# Patient Record
Sex: FEMALE
[De-identification: names, ages, dates, MRNs, and addresses within clinical notes are randomized per-mention and may not be internally consistent; named-entity substitution may affect disease eponyms.]

---

## 2020-06-18 ENCOUNTER — HOSPITAL ENCOUNTER (INPATIENT)
Dept: HOSPITAL 92 - L&D/OP | Age: 25
LOS: 1 days | Discharge: HOME | End: 2020-06-19
Attending: FAMILY MEDICINE | Admitting: FAMILY MEDICINE
Payer: SELF-PAY

## 2020-06-18 VITALS — BODY MASS INDEX: 33.5 KG/M2

## 2020-06-18 DIAGNOSIS — O36.63X0: Primary | ICD-10-CM

## 2020-06-18 DIAGNOSIS — Z3A.39: ICD-10-CM

## 2020-06-18 LAB
HBSAG INDEX: 0.17 S/CO (ref 0–0.99)
HGB BLD-MCNC: 12.1 G/DL (ref 12–16)
MCH RBC QN AUTO: 27 PG (ref 27–31)
MCV RBC AUTO: 80 FL (ref 78–98)
PLATELET # BLD AUTO: 287 THOU/UL (ref 130–400)
RBC # BLD AUTO: 4.49 MILL/UL (ref 4.2–5.4)
SYPHILIS ANTIBODY INDEX: 0.05 S/CO
WBC # BLD AUTO: 10.1 THOU/UL (ref 4.8–10.8)

## 2020-06-18 PROCEDURE — 85018 HEMOGLOBIN: CPT

## 2020-06-18 PROCEDURE — 36415 COLL VENOUS BLD VENIPUNCTURE: CPT

## 2020-06-18 PROCEDURE — 85014 HEMATOCRIT: CPT

## 2020-06-18 PROCEDURE — 10907ZC DRAINAGE OF AMNIOTIC FLUID, THERAPEUTIC FROM PRODUCTS OF CONCEPTION, VIA NATURAL OR ARTIFICIAL OPENING: ICD-10-PCS | Performed by: FAMILY MEDICINE

## 2020-06-18 PROCEDURE — 0HQ9XZZ REPAIR PERINEUM SKIN, EXTERNAL APPROACH: ICD-10-PCS | Performed by: FAMILY MEDICINE

## 2020-06-18 PROCEDURE — 99285 EMERGENCY DEPT VISIT HI MDM: CPT

## 2020-06-18 PROCEDURE — 86901 BLOOD TYPING SEROLOGIC RH(D): CPT

## 2020-06-18 PROCEDURE — 86850 RBC ANTIBODY SCREEN: CPT

## 2020-06-18 PROCEDURE — 86780 TREPONEMA PALLIDUM: CPT

## 2020-06-18 PROCEDURE — 10H073Z INSERTION OF MONITORING ELECTRODE INTO PRODUCTS OF CONCEPTION, VIA NATURAL OR ARTIFICIAL OPENING: ICD-10-PCS | Performed by: FAMILY MEDICINE

## 2020-06-18 PROCEDURE — 86900 BLOOD TYPING SEROLOGIC ABO: CPT

## 2020-06-18 PROCEDURE — 10H07YZ INSERTION OF OTHER DEVICE INTO PRODUCTS OF CONCEPTION, VIA NATURAL OR ARTIFICIAL OPENING: ICD-10-PCS | Performed by: FAMILY MEDICINE

## 2020-06-18 PROCEDURE — 85027 COMPLETE CBC AUTOMATED: CPT

## 2020-06-18 PROCEDURE — 87340 HEPATITIS B SURFACE AG IA: CPT

## 2020-06-18 RX ADMIN — DOCUSATE CALCIUM SCH MG: 240 CAPSULE, LIQUID FILLED ORAL at 21:47

## 2020-06-18 NOTE — PDOC.LDPN
Labor & Delivery Progress Note





- Subjective


Subjective: painful contractions





- Objective


Vital signs reviewed and normal: yes


General: breathing through contractions


SVE: 7100/-1


FHT: category 1


[place] contractions every: 3-5 min


AROM: clear fluid


IUPC placed: yes


FSE placed: yes





- Assessment


(1) Pregnancy


Current Visit: Yes   Status: Acute   


Plan: continue plan of care


-: 











This is a 25 yo  at 39.3 wks by 27 wk sono here for labor


#sIUP


-SVE @ 1500: /-1


- SVE unchanged


- AROM, clear fluid


- IUPC FSE placed


- Cat 1 FHTs after placement of IUPC and FSE, cxn q2-5 min


- does not desire epidural. 


- Continue expectant management, recheck in 2 hours





EMIR Melara MD PGY2

## 2020-06-18 NOTE — PDOC.LDPN
Labor & Delivery Progress Note





- Subjective


Subjective: painful contractions, no concerns





- Objective


Vital signs reviewed and normal: yes


General: breathing through contractions


SVE: soft, anterior


Dilation: 7


Effacement: 100%


Station: -1


FHT: category 1, variability present


[place] contractions every: 3 min


Plan: continue plan of care


-: 





- continue expectant mgmt


- membranes intact


- making change on her own


- does not desire epidural.

## 2020-06-18 NOTE — PDOC.OPDEL
OB Operative/Delivery Note


Delivery Dr/Surgeon: Andrade Srinivasan (continuity), Russ (attending)


Pre-Delivery Diagnosis: active labor


Procedure/Post Delivery Dx: spontaneous vaginal delivery


Weeks gestation: 39 (39.3)


Anesthesia: local (10 mL lidocaine 1%)





- Additional Findings/Plan


Placenta delivered: spontaneous


Repaired Obstetrical Laceration: 1st degree (perineal)


Estimated blood loss: QBL 100mL


Compilations/Other Findings: 





Procedure: Spontaneous Vaginal Delivery


Anesthesia: Local for Repair


QBL: 100 ml





Pre-op Diagnosis:


1. Term intrauterine pregnancy in labor


2. Fetal ultrasound showing enlarged cisterna magnum





Post-op Diagnosis:


1. Term intrauterine pregnancy, delivered


2. same as above





Indications: A 23 y/o female [id number] presents in active labor 





Delivery Note: This is 23yo F  @ 39.3 wks who delivered a viable F 

infant at 1615. Following an uneventful antepartum course, a vigorous female 

was delivered over an intact perineum in the occipitoanterior position. 

Anterior Shoulder and then remainder of the body delivered. No nuchal cord. The 

head was held down and mouth and nares were bulb suctioned. Cord clamped after 

delayed cord clamping and cut and cord blood collected. Placenta delivered 

intact in the [place] presentation with a 3 vessel cord noted. Fundal massage 

was performed and the fundus was firm. The cervix and vagina were inspected. 

First degree perineal laceration noted and repaired with 3-0 vicryl in the 

usual fashion with good approximation and hemostasis after a local anesthetic 

of lidocaine was injected at site. One midline periurethral, one right labial 

abrasions noted and hemostatic. Infant went to mom for skin-to-skin in good 

condition. Apgars were 8/9 at 1 & 5 minutes, respectively. Patient tolerated 

delivery well and went to postpartum after routine recovery/care.





Post delivery plan: routine recovery





Addendum - Attending





- Attending Attestation


Date/Time: 20 7697





I was present for the entire delivery and agree with the above documentation.

## 2020-06-18 NOTE — PDOC.FPROB
FMR OB H&P: Medications





- Current


Allergies/Adverse Reactions: 


 Allergies











Allergy/AdvReac Type Severity Reaction Status Date / Time


 


No Known Allergies Allergy   Verified 20 10:15














FMR OB H&P: Vital Signs





- Maternal


Vital signs: 


 Vital Signs - First Documented











Pulse Resp BP


 


 69   18   130/75 


 


 20 09:45  20 09:45  20 09:45














FMR OB H&P: A/P





- Problem List


(1) Pregnancy


Current Visit: Yes   Status: Acute   


Discussion: 


Date/Time: 20 1012








PCP: Zia PELLETIER


 


HPI:





Patient comes in for contractions which started yesterday. She currently feels 

them about 3 minutes apart. She denies any other complaints.


She affirms fetal movement, denies ROM or bleeding.


Denies HA, visual changes, SOB, or swelling.





History:


OB hx:   x1 in [place]


PMH: Hx of GDM previous preg


PSH: neg


Meds: PNV


All: NKDA


Soc Hx: denies smoking, alcohol, drugs


Fam Hx: denies downs, congenital birth defects





GBS: neg


Blood type: A+


Ab screen: neg


HIV: neg


RPR: neg


Hep B: neg


Rubella: immune


2 hr GTT: neg


GC/CT: neg


PAP: NILM





REVIEW OF SYSTEMS: 


Gen: no fever, chills, or sweats


Neuro: no numbness/tingling, no weakness, denies headache


ENT: denies congestion


Eyes: no visual changes


Resp: denies cough, no production, no SOB, no wheeze


Card: denies chest pain, no palpitations


GI: denies nausea, vomiting, diarrhea


: no dysuria, no hematuria


Skin: no rash, no erythema


Psych: denies hx anxiety/depression





Vitals:  T: 98.5   R: 18   BP: 118/76  P:67 at: 98% on RA  





PHYSICAL EXAMINATION: 


General: NAD, alert and oriented x3


HEENT: EOMI, normal sclera


Neck: Supple. Full ROM.


Heart/Cardiovascular System: RRR, Cap refill < 3 seconds, no rub, no murmur


Lungs/Respiratory System: clear to auscultation bilaterally. No increased work 

of breathing. Room air.


Abdomen/Gastro-Intestinal System: no abdominal tenderness, normal bowel sounds, 

Gravid


Extremities: Warm extremities. No cyanosis or edema. 


Neuro: No gross deficits appreciated


Psychiatry: Awake, Alert and cooperative with exam


Skin: no lesions, no rashes


Musculoskeletal: Full ROM





A/P:





This is a 25 yo  at 39.3 wks by 27 wk sono here for labor


FHT: 150 baseline, mod variability, no decels, accels present


[place]: contractions q3-5 min





# Pregnancy


-   /-1


-   GBS neg


-   Expectant management





# Enlarged cisterna magna


-   Needs fetal US after delivery per [facility]





Addendum - Attending





- Attending Attestation


Date/Time: 20 2962





I personally evaluated the patient and discussed the management with Dr. Melara. 


I agree with the History, Examination, Assessment and Plan documented above 

with any addition or exceptions noted below.





active labor. hx enlarged cysterna magna. US DOL 2. expectant management.

## 2020-06-19 VITALS — SYSTOLIC BLOOD PRESSURE: 100 MMHG | TEMPERATURE: 98.3 F | DIASTOLIC BLOOD PRESSURE: 56 MMHG

## 2020-06-19 LAB — HGB BLD-MCNC: 10.2 G/DL (ref 12–16)

## 2020-06-19 RX ADMIN — BENZOCAINE AND LEVOMENTHOL PRN CAN: 200; 5 SPRAY TOPICAL at 00:19

## 2020-06-19 RX ADMIN — DOCUSATE CALCIUM SCH MG: 240 CAPSULE, LIQUID FILLED ORAL at 08:18

## 2020-06-19 RX ADMIN — BENZOCAINE AND LEVOMENTHOL PRN CAN: 200; 5 SPRAY TOPICAL at 08:31

## 2020-06-19 NOTE — PDOC.PP
Post Partum Progress Note


Post Partum Day #: 1


Subjective: 


Pain well controlled. [place] less than a period. Undecided about contraception. 

Tolerating PO and ambulating. 


PO intake tolerated: yes


Ambulation: yes


 Vital Signs (12 hours)











  Temp Pulse Resp BP Pulse Ox


 


 06/18/20 23:55  98.0 F  77  18  112/58 L  97


 


 06/18/20 20:25  98.3 F  97  18  114/63  97








 Weight











Weight                         85.729 kg

















- Physical Examination


General: NAD


Cardiovascular: no m/r/g, RRR


Respiratory: clear to auscultation bilaterally, non-labored breathing


Abdominal: + bowel sounds, appropriately TTP


Neurological: no gross focal deficits


Psychiatric: A&Ox3, normal affect


Result Diagrams: 


 06/19/20 06:31





Additional Labs: 


 Post Partum Labs











Blood Type  A POSITIVE   06/18/20  11:08    


 


Hep Bs Antigen  Non-Reactive S/CO (NonReactive)   06/18/20  09:59    














- Assessment/Plan





Term pregnancy delivered


- PPD #1


- Continue routine PP care


- Will discuss contraception at [facility] appt in 2 wks. Pt undecided. 


- Would like to go home today if everything looks good with baby. 





Case discussed with Dr Solano who examined the patient








Addendum - Attending





- Attending Attestation


Date/Time: 06/19/20 0956





I personally evaluated the patient and discussed the management with Dr. Zafar. 


I agree with the History, Examination, Assessment and Plan documented above 

with any addition or exceptions noted below.





D/C this afternoon if baby cleared for d/c.

## 2023-05-03 ENCOUNTER — HOSPITAL ENCOUNTER (EMERGENCY)
Dept: HOSPITAL 92 - ERS | Age: 28
Discharge: HOME | End: 2023-05-03
Payer: SELF-PAY

## 2023-05-03 DIAGNOSIS — M54.50: Primary | ICD-10-CM

## 2023-05-03 DIAGNOSIS — E11.9: ICD-10-CM

## 2023-05-03 DIAGNOSIS — Z79.84: ICD-10-CM

## 2023-05-03 LAB
ALBUMIN SERPL BCG-MCNC: 4.1 G/DL (ref 3.5–5)
ALP SERPL-CCNC: 76 U/L (ref 40–110)
ALT SERPL W P-5'-P-CCNC: 30 U/L (ref 8–55)
ANION GAP SERPL CALC-SCNC: 11 MMOL/L (ref 10–20)
AST SERPL-CCNC: 17 U/L (ref 5–34)
BASOPHILS # BLD AUTO: 0.1 THOU/UL (ref 0–0.2)
BASOPHILS NFR BLD AUTO: 0.8 % (ref 0–1)
BILIRUB SERPL-MCNC: 0.4 MG/DL (ref 0.2–1.2)
BUN SERPL-MCNC: 10 MG/DL (ref 7–18.7)
CALCIUM SERPL-MCNC: 9.4 MG/DL (ref 7.8–10.44)
CHLORIDE SERPL-SCNC: 105 MMOL/L (ref 98–107)
CO2 SERPL-SCNC: 24 MMOL/L (ref 22–29)
CREAT CL PREDICTED SERPL C-G-VRATE: 0 ML/MIN (ref 70–130)
EOSINOPHIL # BLD AUTO: 0.2 THOU/UL (ref 0–0.7)
EOSINOPHIL NFR BLD AUTO: 2 % (ref 0–10)
GLOBULIN SER CALC-MCNC: 3.1 G/DL (ref 2.4–3.5)
GLUCOSE SERPL-MCNC: 85 MG/DL (ref 70–105)
HGB BLD-MCNC: 13.7 G/DL (ref 12–16)
LYMPHOCYTES # BLD: 3.1 THOU/UL (ref 1.2–3.4)
LYMPHOCYTES NFR BLD AUTO: 28.6 % (ref 21–51)
MCH RBC QN AUTO: 28 PG (ref 27–31)
MCV RBC AUTO: 85.6 FL (ref 78–98)
MONOCYTES # BLD AUTO: 0.5 THOU/UL (ref 0.11–0.59)
MONOCYTES NFR BLD AUTO: 4.5 % (ref 0–10)
NEUTROPHILS # BLD AUTO: 6.9 THOU/UL (ref 1.4–6.5)
NEUTROPHILS NFR BLD AUTO: 64.1 % (ref 42–75)
PLATELET # BLD AUTO: 271 10X3/UL (ref 130–400)
POTASSIUM SERPL-SCNC: 4.1 MMOL/L (ref 3.5–5.1)
PREGU CONTROL BACKGROUND?: (no result)
PREGU CONTROL BAR APPEAR?: YES
RBC # BLD AUTO: 4.88 MILL/UL (ref 4.2–5.4)
SODIUM SERPL-SCNC: 136 MMOL/L (ref 136–145)
WBC # BLD AUTO: 10.8 10X3/UL (ref 4.8–10.8)

## 2023-05-03 PROCEDURE — 96372 THER/PROPH/DIAG INJ SC/IM: CPT

## 2023-05-03 PROCEDURE — 80053 COMPREHEN METABOLIC PANEL: CPT

## 2023-05-03 PROCEDURE — 85025 COMPLETE CBC W/AUTO DIFF WBC: CPT

## 2023-05-03 PROCEDURE — 81025 URINE PREGNANCY TEST: CPT

## 2023-05-03 PROCEDURE — 71045 X-RAY EXAM CHEST 1 VIEW: CPT

## 2023-05-03 PROCEDURE — 36415 COLL VENOUS BLD VENIPUNCTURE: CPT

## 2024-02-19 ENCOUNTER — HOSPITAL ENCOUNTER (INPATIENT)
Dept: HOSPITAL 92 - CSHLD | Age: 29
LOS: 2 days | Discharge: HOME | End: 2024-02-21
Attending: OBSTETRICS & GYNECOLOGY | Admitting: OBSTETRICS & GYNECOLOGY
Payer: SELF-PAY

## 2024-02-19 VITALS — BODY MASS INDEX: 42 KG/M2

## 2024-02-19 DIAGNOSIS — E66.9: ICD-10-CM

## 2024-02-19 DIAGNOSIS — Z3A.39: ICD-10-CM

## 2024-02-19 LAB
HBSAG INDEX: 0.17 S/CO (ref 0–0.99)
HCT VFR BLD CALC: 32.8 % (ref 34.9–44.5)
HGB BLD-MCNC: 11.3 G/DL (ref 12–15.5)
MCH RBC QN AUTO: 26.2 PG (ref 27–33)
MCV RBC AUTO: 76.1 FL (ref 81.6–98.3)
PLATELET # BLD AUTO: 278 10X3/UL (ref 150–450)
RBC # BLD AUTO: 4.31 10X6/UL (ref 3.9–5.03)
SYPHILIS ANTIBODY INDEX: 0.04 S/CO
WBC # BLD AUTO: 9.6 10X3/UL (ref 3.5–10.5)

## 2024-02-19 PROCEDURE — 86780 TREPONEMA PALLIDUM: CPT

## 2024-02-19 PROCEDURE — 86900 BLOOD TYPING SEROLOGIC ABO: CPT

## 2024-02-19 PROCEDURE — 36416 COLLJ CAPILLARY BLOOD SPEC: CPT

## 2024-02-19 PROCEDURE — 51702 INSERT TEMP BLADDER CATH: CPT

## 2024-02-19 PROCEDURE — 87340 HEPATITIS B SURFACE AG IA: CPT

## 2024-02-19 PROCEDURE — 85027 COMPLETE CBC AUTOMATED: CPT

## 2024-02-19 PROCEDURE — 86901 BLOOD TYPING SEROLOGIC RH(D): CPT

## 2024-02-19 PROCEDURE — 86850 RBC ANTIBODY SCREEN: CPT

## 2024-02-19 PROCEDURE — 36415 COLL VENOUS BLD VENIPUNCTURE: CPT

## 2024-02-20 PROCEDURE — 3E033VJ INTRODUCTION OF OTHER HORMONE INTO PERIPHERAL VEIN, PERCUTANEOUS APPROACH: ICD-10-PCS | Performed by: OBSTETRICS & GYNECOLOGY

## 2024-02-20 PROCEDURE — 10907ZC DRAINAGE OF AMNIOTIC FLUID, THERAPEUTIC FROM PRODUCTS OF CONCEPTION, VIA NATURAL OR ARTIFICIAL OPENING: ICD-10-PCS | Performed by: OBSTETRICS & GYNECOLOGY

## 2024-02-20 PROCEDURE — 0HQ9XZZ REPAIR PERINEUM SKIN, EXTERNAL APPROACH: ICD-10-PCS | Performed by: OBSTETRICS & GYNECOLOGY

## 2024-02-20 RX ADMIN — Medication ONE MLS: at 09:30

## 2024-02-20 RX ADMIN — Medication SCH MLS: at 05:02

## 2024-02-21 VITALS — DIASTOLIC BLOOD PRESSURE: 68 MMHG | SYSTOLIC BLOOD PRESSURE: 118 MMHG

## 2024-02-21 VITALS — TEMPERATURE: 98 F
